# Patient Record
Sex: FEMALE | Race: BLACK OR AFRICAN AMERICAN | NOT HISPANIC OR LATINO | URBAN - METROPOLITAN AREA
[De-identification: names, ages, dates, MRNs, and addresses within clinical notes are randomized per-mention and may not be internally consistent; named-entity substitution may affect disease eponyms.]

---
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
## 2024-05-03 ENCOUNTER — OFFICE VISIT (OUTPATIENT)
Dept: PRIMARY CARE | Facility: CLINIC | Age: 19
End: 2024-05-03
Payer: COMMERCIAL

## 2024-05-03 VITALS
DIASTOLIC BLOOD PRESSURE: 64 MMHG | WEIGHT: 145.2 LBS | BODY MASS INDEX: 27.41 KG/M2 | RESPIRATION RATE: 18 BRPM | HEIGHT: 61 IN | SYSTOLIC BLOOD PRESSURE: 100 MMHG | HEART RATE: 89 BPM | OXYGEN SATURATION: 97 %

## 2024-05-03 DIAGNOSIS — R22.31 MASS OF RIGHT AXILLA: ICD-10-CM

## 2024-05-03 DIAGNOSIS — Z30.011 ENCOUNTER FOR INITIAL PRESCRIPTION OF CONTRACEPTIVE PILLS: ICD-10-CM

## 2024-05-03 DIAGNOSIS — Z00.00 ANNUAL PHYSICAL EXAM: Primary | ICD-10-CM

## 2024-05-03 PROBLEM — S92.901A CLOSED FRACTURE OF BONE OF RIGHT FOOT: Status: RESOLVED | Noted: 2023-09-13 | Resolved: 2024-05-03

## 2024-05-03 PROBLEM — S92.211A: Status: RESOLVED | Noted: 2023-09-13 | Resolved: 2024-05-03

## 2024-05-03 PROBLEM — S92.309A FRACTURE OF METATARSAL BONE: Status: RESOLVED | Noted: 2023-09-13 | Resolved: 2024-05-03

## 2024-05-03 PROBLEM — S93.324A DISLOCATION OF TARSOMETATARSAL JOINT OF RIGHT FOOT: Status: RESOLVED | Noted: 2023-09-13 | Resolved: 2024-05-03

## 2024-05-03 PROBLEM — S92.231A: Status: RESOLVED | Noted: 2023-09-13 | Resolved: 2024-05-03

## 2024-05-03 PROBLEM — R26.2 WALKING DIFFICULTY DUE TO ANKLE AND FOOT: Status: RESOLVED | Noted: 2023-09-13 | Resolved: 2024-05-03

## 2024-05-03 PROBLEM — S92.241A: Status: RESOLVED | Noted: 2023-09-13 | Resolved: 2024-05-03

## 2024-05-03 PROBLEM — S92.221A: Status: RESOLVED | Noted: 2023-09-13 | Resolved: 2024-05-03

## 2024-05-03 LAB — PREGNANCY TEST URINE, POC: NEGATIVE

## 2024-05-03 PROCEDURE — 81025 URINE PREGNANCY TEST: CPT | Performed by: NURSE PRACTITIONER

## 2024-05-03 PROCEDURE — 99395 PREV VISIT EST AGE 18-39: CPT | Performed by: NURSE PRACTITIONER

## 2024-05-03 PROCEDURE — 99213 OFFICE O/P EST LOW 20 MIN: CPT | Performed by: NURSE PRACTITIONER

## 2024-05-03 PROCEDURE — 1036F TOBACCO NON-USER: CPT | Performed by: NURSE PRACTITIONER

## 2024-05-03 RX ORDER — NORGESTIMATE AND ETHINYL ESTRADIOL 0.25-0.035
1 KIT ORAL DAILY
Qty: 28 TABLET | Refills: 12 | Status: SHIPPED | OUTPATIENT
Start: 2024-05-03

## 2024-05-03 ASSESSMENT — PATIENT HEALTH QUESTIONNAIRE - PHQ9
SUM OF ALL RESPONSES TO PHQ9 QUESTIONS 1 AND 2: 0
1. LITTLE INTEREST OR PLEASURE IN DOING THINGS: NOT AT ALL
2. FEELING DOWN, DEPRESSED OR HOPELESS: NOT AT ALL

## 2024-05-03 NOTE — PROGRESS NOTES
Cleo Jeff is a 18 y.o. female who is presenting for annual physical exam.     Would like to restart her OCP  Stopped taking last  due to an insurance issue  She is sexually active with 1 male partner  Her LMP was the 2nd week of April     Noticed a lump to her right axilla for years but noticed it getting bigger a few months ago  Does not hurt, does not drain  Denies breast lumps, nipple discharge  Denies family hx of breast CA    Last  Visit: 22  Reported Health: Good    Dental, Vision, Hearing:  Regular dental visits: yes  - Brushes teeth 2 times per day  - Flosses? sometimes  Vision problems: yes  - Wears glasses or contacts? yes, glasses  - Last eye exam:    Hearing loss: no    Immunization status:  Up to date: yes    Lifestyle:  Healthy diet: yes  Regular exercise: no  Alcohol: no  Tobacco: no  Drugs: no    Reproductive Health:  Menstrual problems: no  As noted above    Pregnancy History:   : 1  Parity: 0  - Full term:   - Premature:  - (s): 1  - Living:  - AB Induced:  - AB Spont:  - Ectopic:   - Multiple births:    Cervical Cancer Screening:  Last pap: n/a  Breast Cancer Screening:  Last mammogram:  n/a  Colorectal Cancer Screening:  Last colonoscopy or Cologuard:  n/a  Metabolic Screening:  Lipid profile: none  Glucose screen:     Review of Systems  Gen: denies fever, chills, weight loss, fatigue  HEENT: denies sinus pressure, sinus congestion, runny nose, red eyes, itchy eyes, vision loss, ear pain, hearing loss, throat pain, trouble swallowing  Neck: denies neck pain, neck swelling or masses  Chest/breast: as noted in HPI  CV: denies chest pain, palpitations, fast heart rate, syncope  Resp: denies shortness of breath, cough, wheezing  GI: denies abdominal pain, nausea, diarrhea, constipation, hematochezia, melena  : denies dysuria, hematuria, vaginal/penile discharge, frequency  Endo: denies polydipsia, polyuria, heat/cold intolerance, weight change, hair  thinning  Heme: denies easy bruising, easy bleeding  Neuro: denies headache, numbness, tingling, memory loss, changes in vision  MSK: denies joint pain, joint swelling, weakness  Psych: denies suicidal ideation, homicidal ideation, depression, anxiety, mood swings  Skin: denies rashes, abnormal lesions, itching, changes in moles     Previous History  Past Medical History:   Diagnosis Date    Personal history of other diseases of the digestive system 06/09/2017    History of chronic constipation    Personal history of other diseases of the respiratory system     Personal history of asthma    Unspecified abdominal pain 06/09/2017    Abdominal cramping     History reviewed. No pertinent surgical history.  Social History     Tobacco Use    Smoking status: Never    Smokeless tobacco: Never   Substance Use Topics    Alcohol use: Never    Drug use: Yes     Types: Marijuana     Comment: 1 or 2 times a week     Family History   Problem Relation Name Age of Onset    No Known Problems Mother      No Known Problems Father      Cancer Maternal Grandmother      Colon cancer Maternal Grandfather      No Known Problems Paternal Grandmother      No Known Problems Paternal Grandfather       No Known Allergies  Current Outpatient Medications   Medication Instructions    loratadine (CLARITIN) 10 mg, oral, Daily    Sandra 0.25-35 mg-mcg tablet 1 tablet, oral, Daily    norgestimate-ethinyl estradioL (Sprintec, 28,) 0.25-35 mg-mcg tablet 1 tablet, oral, Daily       Physical Exam  Patient declined chaperone for exam.     General: Alert and oriented, in no acute distress. Appears stated age, well-nourished, and well hydrated  HEENT:  - Head: Normocephalic and atraumatic   - Eyes: EOMI, PERRLA  - ENT: Hearing grossly intact. Mucus membranes pink and moist without lesions. Tonsils present without swelling or exudates. Good dentition. TMs gray  Neck: Supple. No stiffness. No thyromegaly or thyroid nodules  Heart: RRR. No murmurs, clicks, or  rubs  Lungs: Unlabored breathing. CTAB with no crackles, wheezes, or rhonchi  Abdomen: Normal BS in all 4 quadrants. Soft, non-tender, non-distended, with no masses  Breasts: Large R axilla fluctuant lump. Breasts nontender. No skin changes, dimpling, or nipple discharge. No lumps palpated  Extremities: Warm and well perfuses. No edema. Normal peripheral pulses  Musculoskeletal: ROM intact. Strength 5/5 in BUE and BLE. No joint swelling. Normal gait and station  Neurological: Alert and oriented. No gross neurological deficits. Normal sensation. No weakness. DTRs +2/4   Psychological: Appropriate mood and affect  Skin: No rash, abnormal lesions, cyanosis, or erythema      Assessment and Plan     1. Contraception management  - IO HCG negative  - Rx sent for Sprintec, can start 1st Sunday after next period, advised to use back up method for 7 days    2. R axilla lump  - Ultrasound of R breast ordered    3. Annual Physical  - Up to date on vaccines  - Declined labs today  - Pap starting at age 21  - Maintain healthy lifestyle    RTC in 1 year for physical or sooner prKRISTEL Young-CNP  [facility]

## 2024-05-03 NOTE — PATIENT INSTRUCTIONS
You can start the birth control pills on the first Sunday after your next period starts. Use a back up method for 7 days (condoms)

## 2024-06-05 ENCOUNTER — HOSPITAL ENCOUNTER (OUTPATIENT)
Dept: RADIOLOGY | Facility: CLINIC | Age: 19
Discharge: HOME | End: 2024-06-05
Payer: COMMERCIAL

## 2024-06-05 DIAGNOSIS — R22.31 MASS OF RIGHT AXILLA: ICD-10-CM

## 2024-06-05 DIAGNOSIS — Q83.1 ACCESSORY BREAST TISSUE OF AXILLA: Primary | ICD-10-CM

## 2024-06-05 PROCEDURE — 76641 ULTRASOUND BREAST COMPLETE: CPT | Mod: RT

## 2024-06-05 PROCEDURE — 76642 ULTRASOUND BREAST LIMITED: CPT | Mod: RT

## 2025-02-27 ENCOUNTER — APPOINTMENT (OUTPATIENT)
Dept: OBSTETRICS AND GYNECOLOGY | Facility: CLINIC | Age: 20
End: 2025-02-27
Payer: COMMERCIAL

## 2025-04-01 PROBLEM — L70.9 ACNE: Status: ACTIVE | Noted: 2025-04-01

## 2025-04-03 ENCOUNTER — APPOINTMENT (OUTPATIENT)
Dept: PRIMARY CARE | Facility: CLINIC | Age: 20
End: 2025-04-03
Payer: COMMERCIAL

## 2025-04-03 VITALS
DIASTOLIC BLOOD PRESSURE: 62 MMHG | HEIGHT: 61 IN | WEIGHT: 139.2 LBS | SYSTOLIC BLOOD PRESSURE: 100 MMHG | HEART RATE: 81 BPM | BODY MASS INDEX: 26.28 KG/M2

## 2025-04-03 DIAGNOSIS — L73.9 FOLLICULITIS: Primary | ICD-10-CM

## 2025-04-03 DIAGNOSIS — Z13.6 SCREENING FOR CARDIOVASCULAR CONDITION: ICD-10-CM

## 2025-04-03 DIAGNOSIS — Z30.011 ENCOUNTER FOR INITIAL PRESCRIPTION OF CONTRACEPTIVE PILLS: ICD-10-CM

## 2025-04-03 PROCEDURE — 3008F BODY MASS INDEX DOCD: CPT | Performed by: NURSE PRACTITIONER

## 2025-04-03 PROCEDURE — 1036F TOBACCO NON-USER: CPT | Performed by: NURSE PRACTITIONER

## 2025-04-03 PROCEDURE — 99214 OFFICE O/P EST MOD 30 MIN: CPT | Performed by: NURSE PRACTITIONER

## 2025-04-03 RX ORDER — CLINDAMYCIN PHOSPHATE 10 UG/ML
LOTION TOPICAL
Qty: 60 ML | Refills: 0 | Status: SHIPPED | OUTPATIENT
Start: 2025-04-03

## 2025-04-03 RX ORDER — NORGESTIMATE AND ETHINYL ESTRADIOL 0.25-0.035
1 KIT ORAL DAILY
Qty: 28 TABLET | Refills: 12 | Status: SHIPPED | OUTPATIENT
Start: 2025-04-03

## 2025-04-03 ASSESSMENT — ENCOUNTER SYMPTOMS
LOSS OF SENSATION IN FEET: 0
DEPRESSION: 0
OCCASIONAL FEELINGS OF UNSTEADINESS: 0

## 2025-04-03 ASSESSMENT — PATIENT HEALTH QUESTIONNAIRE - PHQ9
SUM OF ALL RESPONSES TO PHQ9 QUESTIONS 1 AND 2: 0
10. IF YOU CHECKED OFF ANY PROBLEMS, HOW DIFFICULT HAVE THESE PROBLEMS MADE IT FOR YOU TO DO YOUR WORK, TAKE CARE OF THINGS AT HOME, OR GET ALONG WITH OTHER PEOPLE: NOT DIFFICULT AT ALL
1. LITTLE INTEREST OR PLEASURE IN DOING THINGS: NOT AT ALL
2. FEELING DOWN, DEPRESSED OR HOPELESS: NOT AT ALL

## 2025-04-03 NOTE — PROGRESS NOTES
"Subjective   Patient ID: Cleo Jeff is a 19 y.o. female who presents for Med Refill (Birth control, Also requesting blood work for allergy test. ).    HPI     Patient would like a refill of her OCP. She only has one pack left. She is not currently sexually active. Periods are normal and not painful.     She also mentions that last 2 times she has gone swimming, she developed a rash to her face, stomach, and arms. This occurred the last 2 summers. The rash showed up 3-4 hours later and took about a week to go away. The rash was itchy. She took benadryl. She denies any facial or mouth swelling, or difficulty breathing with this.     Review of Systems  ROS negative except as noted above in HPI.     Objective   /62 (BP Location: Left arm, Patient Position: Sitting)   Pulse 81   Ht 1.549 m (5' 1\")   Wt 63.1 kg (139 lb 3.2 oz)   BMI 26.30 kg/m²     Physical Exam  General: Alert and oriented, in no acute distress. Appears stated age, well-nourished, and well hydrated  HEENT:  - Head: Normocephalic and atraumatic   - Eyes: EOMI, PERRLA  - ENT: Hearing grossly intact  Heart: RRR. No murmurs, clicks, or rubs  Lungs: Unlabored breathing. CTAB with no crackles, wheezes, or rhonchi  Abdomen: Normal BS in all 4 quadrants. Soft, non-tender, non-distended, with no masses  Musculoskeletal: Normal gait and station  Neurological: Alert and oriented. No gross neurological deficits  Psychological: Appropriate mood and affect  Skin: No rash, abnormal lesions, cyanosis, or erythema     Assessment/Plan   Diagnoses and all orders for this visit:  Encounter for initial prescription of contraceptive pills  - norgestimate-ethinyl estradiol (Sprintec, 28,) 0.25-35 mg-mcg tablet; Take 1 tablet by mouth once daily.  Folliculitis  - Patient did show me a photo of the rash that occurred last summer, appears to be folliculitis  - clindamycin (Cleocin T) 1 % lotion; Apply to affected area twice daily for 10 days; patient will likely " be swimming this summer, so provided prescription in the event this occurs   Screening for cardiovascular condition  - Comprehensive metabolic panel; Future  - Lipid Panel; Future  - Tsh With Reflex To Free T4 If Abnormal; Future  - CBC; Future    FU in 1-2 months for physical or sooner KRISTEL Smallwood-CNP  [facility]

## 2025-04-07 ENCOUNTER — APPOINTMENT (OUTPATIENT)
Dept: PRIMARY CARE | Facility: CLINIC | Age: 20
End: 2025-04-07
Payer: COMMERCIAL

## 2025-04-08 ENCOUNTER — APPOINTMENT (OUTPATIENT)
Dept: PRIMARY CARE | Facility: CLINIC | Age: 20
End: 2025-04-08
Payer: COMMERCIAL

## 2025-05-22 LAB
ALBUMIN SERPL-MCNC: 4.4 G/DL (ref 3.6–5.1)
ALP SERPL-CCNC: 64 U/L (ref 31–125)
ALT SERPL-CCNC: 10 U/L (ref 6–29)
ANION GAP SERPL CALCULATED.4IONS-SCNC: 9 MMOL/L (CALC) (ref 7–17)
AST SERPL-CCNC: 12 U/L (ref 10–30)
BILIRUB SERPL-MCNC: 0.4 MG/DL (ref 0.2–1.2)
BUN SERPL-MCNC: 8 MG/DL (ref 7–25)
CALCIUM SERPL-MCNC: 9.5 MG/DL (ref 8.6–10.2)
CHLORIDE SERPL-SCNC: 104 MMOL/L (ref 98–110)
CHOLEST SERPL-MCNC: 143 MG/DL
CHOLEST/HDLC SERPL: 2.7 (CALC)
CO2 SERPL-SCNC: 26 MMOL/L (ref 20–32)
CREAT SERPL-MCNC: 0.75 MG/DL (ref 0.5–0.96)
EGFRCR SERPLBLD CKD-EPI 2021: 117 ML/MIN/1.73M2
ERYTHROCYTE [DISTWIDTH] IN BLOOD BY AUTOMATED COUNT: 13.6 % (ref 11–15)
GLUCOSE SERPL-MCNC: 77 MG/DL (ref 65–99)
HCT VFR BLD AUTO: 41.8 % (ref 35–45)
HDLC SERPL-MCNC: 53 MG/DL
HGB BLD-MCNC: 13.2 G/DL (ref 11.7–15.5)
LDLC SERPL CALC-MCNC: 75 MG/DL (CALC)
MCH RBC QN AUTO: 29.5 PG (ref 27–33)
MCHC RBC AUTO-ENTMCNC: 31.6 G/DL (ref 32–36)
MCV RBC AUTO: 93.5 FL (ref 80–100)
NONHDLC SERPL-MCNC: 90 MG/DL (CALC)
PLATELET # BLD AUTO: 332 THOUSAND/UL (ref 140–400)
PMV BLD REES-ECKER: 10.1 FL (ref 7.5–12.5)
POTASSIUM SERPL-SCNC: 4.1 MMOL/L (ref 3.5–5.3)
PROT SERPL-MCNC: 7.2 G/DL (ref 6.1–8.1)
RBC # BLD AUTO: 4.47 MILLION/UL (ref 3.8–5.1)
SODIUM SERPL-SCNC: 139 MMOL/L (ref 135–146)
TRIGL SERPL-MCNC: 74 MG/DL
TSH SERPL-ACNC: 1 MIU/L
WBC # BLD AUTO: 5.7 THOUSAND/UL (ref 3.8–10.8)

## 2025-06-02 ENCOUNTER — APPOINTMENT (OUTPATIENT)
Dept: PRIMARY CARE | Facility: CLINIC | Age: 20
End: 2025-06-02
Payer: COMMERCIAL

## 2025-06-02 VITALS
SYSTOLIC BLOOD PRESSURE: 98 MMHG | BODY MASS INDEX: 26.24 KG/M2 | DIASTOLIC BLOOD PRESSURE: 66 MMHG | RESPIRATION RATE: 18 BRPM | WEIGHT: 139 LBS | HEIGHT: 61 IN | OXYGEN SATURATION: 98 % | HEART RATE: 70 BPM

## 2025-06-02 DIAGNOSIS — Z00.00 ANNUAL PHYSICAL EXAM: Primary | ICD-10-CM

## 2025-06-02 PROCEDURE — 99395 PREV VISIT EST AGE 18-39: CPT | Performed by: NURSE PRACTITIONER

## 2025-06-02 PROCEDURE — 1036F TOBACCO NON-USER: CPT | Performed by: NURSE PRACTITIONER

## 2025-06-02 PROCEDURE — 3008F BODY MASS INDEX DOCD: CPT | Performed by: NURSE PRACTITIONER

## 2025-06-02 ASSESSMENT — PATIENT HEALTH QUESTIONNAIRE - PHQ9
SUM OF ALL RESPONSES TO PHQ9 QUESTIONS 1 AND 2: 0
2. FEELING DOWN, DEPRESSED OR HOPELESS: NOT AT ALL
1. LITTLE INTEREST OR PLEASURE IN DOING THINGS: NOT AT ALL

## 2025-06-02 ASSESSMENT — COLUMBIA-SUICIDE SEVERITY RATING SCALE - C-SSRS
6. HAVE YOU EVER DONE ANYTHING, STARTED TO DO ANYTHING, OR PREPARED TO DO ANYTHING TO END YOUR LIFE?: NO
1. IN THE PAST MONTH, HAVE YOU WISHED YOU WERE DEAD OR WISHED YOU COULD GO TO SLEEP AND NOT WAKE UP?: NO
2. HAVE YOU ACTUALLY HAD ANY THOUGHTS OF KILLING YOURSELF?: NO

## 2025-06-02 ASSESSMENT — ENCOUNTER SYMPTOMS
OCCASIONAL FEELINGS OF UNSTEADINESS: 0
DEPRESSION: 0
LOSS OF SENSATION IN FEET: 0

## 2025-06-02 NOTE — PROGRESS NOTES
Cleo Jeff is a 19 y.o. female who is presenting for annual physical exam. She has no concerns today.     Last  Visit: 5/3/2024  Reported Health: Fair    Dental, Vision, Hearing:  Regular dental visits: yes  - Brushes teeth 2 times per day  Vision problems: yes  - Wears glasses or contacts? yes, glasses  - Last eye exam: 2024  Hearing loss: no    Immunization status:  Up to date: yes    Lifestyle:  Healthy diet: okay  Regular exercise: yes  - Exercise frequency: 2-3 days per week  - Type of exercise: weights  Alcohol: no  Tobacco: no  Drugs: no    Reproductive Health:  Menstrual problems: no  - LMP: mid-May  Sexually active: yes, 1 male partner  Contraception: OCP    Pregnancy History:   : 1  Parity: 0  - Full term:   - Premature:  - (s): 1  - Living:  - AB Induced:  - AB Spont:  - Ectopic:   - Multiple births:    Cervical Cancer Screening:  Last pap: n/a  Breast Cancer Screening:  Last mammogram: n/a  Colorectal Cancer Screening:  Last colonoscopy or Cologuard: n/a  Metabolic Screening:  Lipid profile: 2025  Glucose screen: 2025    Review of Systems  Gen: denies fever, chills, weight loss, fatigue  HEENT: denies sinus pressure, sinus congestion, runny nose, red eyes, itchy eyes, vision loss, ear pain, hearing loss, throat pain, trouble swallowing  Neck: denies neck pain, neck swelling or masses  Chest/breast: denies breast pain, breast lumps, nipple discharge  CV: denies chest pain, palpitations, fast heart rate, syncope  Resp: denies shortness of breath, cough, wheezing  GI: denies abdominal pain, nausea, diarrhea, constipation, hematochezia, melena  : denies dysuria, hematuria, vaginal discharge, frequency  Endo: denies polydipsia, polyuria, heat/cold intolerance, weight change, hair thinning  Heme: denies easy bruising, easy bleeding  Neuro: denies headache, numbness, tingling, memory loss, changes in vision  MSK: denies joint pain, joint swelling, weakness  Psych:  denies suicidal ideation, homicidal ideation, depression, anxiety, mood swings  Skin: denies rashes, abnormal lesions, itching, changes in moles     Previous History  Medical History[1]  Surgical History[2]  Social History[3]  Family History[4]  Allergies[5]  Current Outpatient Medications   Medication Instructions    clindamycin (Cleocin T) 1 % lotion Apply to affected area twice daily for 10 days    loratadine (CLARITIN) 10 mg, Daily    norgestimate-ethinyl estradiol (Sprintec, 28,) 0.25-35 mg-mcg tablet 1 tablet, oral, Daily     Immunization History   Administered Date(s) Administered    DTaP IPV combined vaccine (KINRIX, QUADRACEL) 08/10/2009    DTaP vaccine, pediatric  (INFANRIX) 2004, 01/18/2005, 03/08/2005, 10/03/2005    DTaP, Unspecified 2004, 01/18/2005, 03/08/2005, 10/03/2005    HPV 9-valent vaccine (GARDASIL 9) 02/12/2016    HPV, Quadrivalent 08/17/2015, 02/12/2016, 08/05/2016    Hepatitis A vaccine, pediatric/adolescent (HAVRIX, VAQTA) 08/19/2013, 08/20/2014    Hepatitis B vaccine, 19 yrs and under (RECOMBIVAX, ENGERIX) 2004, 2004, 04/29/2005    Hepatitis B vaccine, adult *Check Product/Dose* 2004, 2004, 04/29/2005    HiB PRP-OMP conjugate vaccine, pediatric (PEDVAXHIB) 2004, 01/18/2005, 03/08/2005, 10/03/2005    HiB, unspecified 2004, 01/18/2005, 03/08/2005, 10/03/2005    Influenza, injectable, quadrivalent 10/17/2023    MMR vaccine, subcutaneous (MMR II) 10/03/2005, 01/24/2006    Meningococcal ACWY vaccine (MENVEO) 08/10/2020    Meningococcal ACWY-D (Menactra) 4-valent conjugate vaccine 08/17/2015, 08/10/2020    Meningococcal B vaccine (BEXSERO) 08/10/2020, 07/19/2021    Meningococcal B, Unspecified 08/10/2020, 07/19/2021    Pfizer Gray Cap SARS-CoV-2 03/21/2023, 04/11/2023    Pneumococcal Conjugate PCV 7 2004, 01/18/2005, 03/08/2005, 10/03/2005    Polio, Unspecified 2004, 01/18/2005, 01/24/2006    Poliovirus vaccine, subcutaneous (IPOL)  "2004, 01/18/2005, 01/24/2006    Tdap vaccine, age 7 year and older (BOOSTRIX, ADACEL) 08/17/2015    Varicella vaccine, subcutaneous (VARIVAX) 10/03/2005, 08/10/2009       Visit Vitals  BP 98/66 (BP Location: Left arm, Patient Position: Sitting, BP Cuff Size: Adult)   Pulse 70   Resp 18   Ht 1.549 m (5' 0.98\")   Wt 63 kg (139 lb)   LMP 05/15/2025 (Approximate)   SpO2 98%   BMI 26.28 kg/m²   OB Status Having periods   Smoking Status Never   BSA 1.65 m²      Lab Results   Component Value Date    CHOL 143 05/21/2025     Lab Results   Component Value Date    HDL 53 05/21/2025     Lab Results   Component Value Date    LDLCALC 75 05/21/2025     Lab Results   Component Value Date    TRIG 74 05/21/2025     No components found for: \"CHOLHDL\"     Lab Results   Component Value Date    TSH 1.00 05/21/2025      Lab Results   Component Value Date    WBC 5.7 05/21/2025    HGB 13.2 05/21/2025    HCT 41.8 05/21/2025    MCV 93.5 05/21/2025     05/21/2025        Chemistry    Lab Results   Component Value Date/Time     05/21/2025 0947    K 4.1 05/21/2025 0947     05/21/2025 0947    CO2 26 05/21/2025 0947    BUN 8 05/21/2025 0947    CREATININE 0.75 05/21/2025 0947    Lab Results   Component Value Date/Time    CALCIUM 9.5 05/21/2025 0947    ALKPHOS 64 05/21/2025 0947    AST 12 05/21/2025 0947    ALT 10 05/21/2025 0947    BILITOT 0.4 05/21/2025 0947           Physical Exam  General: Alert and oriented, in no acute distress. Appears stated age, well-nourished, and well hydrated  HEENT:  - Head: Normocephalic and atraumatic   - Eyes: EOMI, PERRLA  - ENT: Hearing grossly intact. Mucus membranes pink and moist without lesions. Tonsils present without swelling or exudates. Good dentition. TMs gray  Neck: Supple. No stiffness. No thyromegaly or thyroid nodules  Heart: RRR. No murmurs, clicks, or rubs  Lungs: Unlabored breathing. CTAB with no crackles, wheezes, or rhonchi  Abdomen: Normal BS in all 4 quadrants. Soft, " non-tender, non-distended, with no masses  Extremities: Warm and well perfused. No edema. Normal peripheral pulses  Musculoskeletal: ROM intact. Strength 5/5 in BUE and BLE. No joint swelling. Normal gait and station  Neurological: Alert and oriented. No gross neurological deficits. Normal sensation. No weakness. DTRs +2/4   Psychological: Appropriate mood and affect  Skin: No rash, abnormal lesions, cyanosis, or erythema      Assessment and Plan     Cleo was seen today for annual exam.  Diagnoses and all orders for this visit:  Annual physical exam (Primary)  - Up to date on vaccines  - Pap due next year  - Reviewed labs from 5/21/2025  - Maintain healthy lifestyle    FU in 1 year for physical with pap; will run out of OCP prior so can call for refill    KRISTEL Junior-[facility]       [1]   Past Medical History:  Diagnosis Date    Closed fracture of bone of right foot 09/13/2023    Dislocation of tarsometatarsal joint of right foot 09/13/2023    Fracture of intermediate cuneiform of right foot 09/13/2023    Fracture of lateral cuneiform of right foot 09/13/2023    Fracture of medial cuneiform of right foot 09/13/2023    Fracture of metatarsal bone 09/13/2023    Fracture of right cuboid bone 09/13/2023    Personal history of other diseases of the digestive system 06/09/2017    History of chronic constipation    Personal history of other diseases of the respiratory system     Personal history of asthma    Unspecified abdominal pain 06/09/2017    Abdominal cramping    Walking difficulty due to ankle and foot 09/13/2023   [2] History reviewed. No pertinent surgical history.  [3]   Social History  Tobacco Use    Smoking status: Never     Passive exposure: Never    Smokeless tobacco: Never   Vaping Use    Vaping status: Never Used   Substance Use Topics    Alcohol use: Never    Drug use: Not Currently     Types: Marijuana     Comment: 1 or 2 times a week   [4]   Family History  Problem  Relation Name Age of Onset    No Known Problems Mother      No Known Problems Father      Cancer Maternal Grandmother      Colon cancer Maternal Grandfather      No Known Problems Paternal Grandmother      No Known Problems Paternal Grandfather     [5] No Known Allergies

## 2025-06-09 ENCOUNTER — APPOINTMENT (OUTPATIENT)
Dept: PRIMARY CARE | Facility: CLINIC | Age: 20
End: 2025-06-09
Payer: COMMERCIAL

## 2025-10-23 ENCOUNTER — APPOINTMENT (OUTPATIENT)
Dept: PRIMARY CARE | Facility: CLINIC | Age: 20
End: 2025-10-23
Payer: COMMERCIAL